# Patient Record
Sex: FEMALE | Employment: FULL TIME | URBAN - METROPOLITAN AREA
[De-identification: names, ages, dates, MRNs, and addresses within clinical notes are randomized per-mention and may not be internally consistent; named-entity substitution may affect disease eponyms.]

---

## 2018-04-12 ENCOUNTER — HOSPITAL ENCOUNTER (EMERGENCY)
Facility: HOSPITAL | Age: 28
Discharge: LEFT BEFORE DISCHARGE | End: 2018-04-12
Attending: EMERGENCY MEDICINE
Payer: MEDICAID

## 2018-04-12 VITALS
HEART RATE: 79 BPM | TEMPERATURE: 98 F | OXYGEN SATURATION: 100 % | WEIGHT: 128 LBS | HEIGHT: 68 IN | RESPIRATION RATE: 18 BRPM | BODY MASS INDEX: 19.4 KG/M2 | SYSTOLIC BLOOD PRESSURE: 129 MMHG | DIASTOLIC BLOOD PRESSURE: 71 MMHG

## 2018-04-12 DIAGNOSIS — Z3A.00 WEEKS OF GESTATION OF PREGNANCY NOT SPECIFIED: ICD-10-CM

## 2018-04-12 DIAGNOSIS — Z34.90 PREGNANCY, UNSPECIFIED GESTATIONAL AGE: Primary | ICD-10-CM

## 2018-04-12 DIAGNOSIS — R10.30 LOWER ABDOMINAL PAIN: ICD-10-CM

## 2018-04-12 LAB
ABO + RH BLD: NORMAL
ALBUMIN SERPL BCP-MCNC: 3.3 G/DL
ALP SERPL-CCNC: 66 U/L
ALT SERPL W/O P-5'-P-CCNC: 7 U/L
ANION GAP SERPL CALC-SCNC: 9 MMOL/L
AST SERPL-CCNC: 19 U/L
B-HCG UR QL: POSITIVE
BASOPHILS # BLD AUTO: 0.02 K/UL
BASOPHILS NFR BLD: 0.3 %
BILIRUB SERPL-MCNC: 0.2 MG/DL
BUN SERPL-MCNC: 8 MG/DL
CALCIUM SERPL-MCNC: 9.4 MG/DL
CHLORIDE SERPL-SCNC: 104 MMOL/L
CO2 SERPL-SCNC: 20 MMOL/L
CREAT SERPL-MCNC: 0.6 MG/DL
CTP QC/QA: YES
DIFFERENTIAL METHOD: ABNORMAL
EOSINOPHIL # BLD AUTO: 0.2 K/UL
EOSINOPHIL NFR BLD: 2 %
ERYTHROCYTE [DISTWIDTH] IN BLOOD BY AUTOMATED COUNT: 16.2 %
EST. GFR  (AFRICAN AMERICAN): >60 ML/MIN/1.73 M^2
EST. GFR  (NON AFRICAN AMERICAN): >60 ML/MIN/1.73 M^2
GLUCOSE SERPL-MCNC: 89 MG/DL
HCG INTACT+B SERPL-ACNC: NORMAL MIU/ML
HCT VFR BLD AUTO: 28 %
HGB BLD-MCNC: 8.5 G/DL
IMM GRANULOCYTES # BLD AUTO: 0.03 K/UL
IMM GRANULOCYTES NFR BLD AUTO: 0.4 %
LYMPHOCYTES # BLD AUTO: 1.8 K/UL
LYMPHOCYTES NFR BLD: 22.7 %
MCH RBC QN AUTO: 24.1 PG
MCHC RBC AUTO-ENTMCNC: 30.4 G/DL
MCV RBC AUTO: 79 FL
MONOCYTES # BLD AUTO: 0.6 K/UL
MONOCYTES NFR BLD: 8 %
NEUTROPHILS # BLD AUTO: 5.2 K/UL
NEUTROPHILS NFR BLD: 66.6 %
NRBC BLD-RTO: 0 /100 WBC
PLATELET # BLD AUTO: 181 K/UL
PMV BLD AUTO: 11.4 FL
POTASSIUM SERPL-SCNC: 3.7 MMOL/L
PROT SERPL-MCNC: 7.9 G/DL
RBC # BLD AUTO: 3.53 M/UL
SODIUM SERPL-SCNC: 133 MMOL/L
WBC # BLD AUTO: 7.85 K/UL

## 2018-04-12 PROCEDURE — 85025 COMPLETE CBC W/AUTO DIFF WBC: CPT

## 2018-04-12 PROCEDURE — 81025 URINE PREGNANCY TEST: CPT | Performed by: EMERGENCY MEDICINE

## 2018-04-12 PROCEDURE — 84702 CHORIONIC GONADOTROPIN TEST: CPT

## 2018-04-12 PROCEDURE — 99283 EMERGENCY DEPT VISIT LOW MDM: CPT | Mod: ,,, | Performed by: EMERGENCY MEDICINE

## 2018-04-12 PROCEDURE — 86901 BLOOD TYPING SEROLOGIC RH(D): CPT

## 2018-04-12 PROCEDURE — 80053 COMPREHEN METABOLIC PANEL: CPT

## 2018-04-12 PROCEDURE — 99284 EMERGENCY DEPT VISIT MOD MDM: CPT | Mod: 25

## 2018-04-12 NOTE — ED NOTES
Patient identifiers verified and correct for Celio Gutierres.    LOC: The patient is awake, alert and aware of environment with an appropriate affect, the patient is oriented x 3 and speaking appropriately.  APPEARANCE: Patient resting comfortably and in no acute distress, patient is clean and well groomed, patient's clothing is properly fastened.  SKIN: The skin is warm and dry, color consistent with ethnicity, patient has normal skin turgor and moist mucus membranes, skin intact, no breakdown or bruising noted.  MUSCULOSKELETAL: Patient moving all extremities spontaneously, no obvious swelling or deformities noted.  RESPIRATORY: Airway is open and patent, respirations are spontaneous, patient has a normal effort and rate, no accessory muscle use noted.  CARDIAC: Patient has a normal rate and regular rhythm, no periphreal edema noted, capillary refill < 3 seconds.  ABDOMEN: Soft and non tender to palpation, no distention noted, normoactive bowel sounds present in all four quadrants.  NEUROLOGIC: PERRL, 3mm bilaterally, eyes open spontaneously, behavior appropriate to situation, follows commands, facial expression symmetrical, bilateral hand grasp equal and even, purposeful motor response noted, normal sensation in all extremities when touched with a finger.

## 2018-04-12 NOTE — ED NOTES
Patient now reports that she did have some spotting yesterday. Every time I wiped there was a little blood on the tissue.

## 2018-04-12 NOTE — ED NOTES
Pt reports lower abdominal 4/10 pain and reports she is pregnant with spotting. Pt reports finding out being pregnant three days ago at her doctor's office. Pt also reports nausea.

## 2018-04-12 NOTE — ED NOTES
Intake informed patient has returned from [place] with her son. Grandmother here and is able to care for her Grandson while her daughter is seen.

## 2018-04-12 NOTE — ED NOTES
LOC: The patient is awake, alert and aware of environment with an appropriate affect, the patient is oriented x 3 and speaking appropriate.  APPEARANCE: Patient resting comfortably and in no acute distress, patient is clean and well groomed, patient's clothing is properly fastened.  SKIN: The skin is warm and dry, patient has normal skin turgor and moist mucus membranes.  RESPIRATORY: Airway is open and patent, respirations are spontaneous, patient has a normal effort and rate.  CARDIAC: Patient has a normal rate and rhythm, no periphreal edema noted, capillary refill < 3 seconds.  ABDOMEN: Pregnant abdomen,last menses 1/2018. Supra pubic area is tender to palpation,  NEUROLOGIC: PERRL, facial expression is symmetrical, patient moving all extremities, normal sensation in all extremities when touched with a finger.  OB/GYN: Denies painful urination or vaginal discharge,just normal milky.

## 2018-04-13 NOTE — ED PROVIDER NOTES
"Encounter Date: 4/12/2018    SCRIBE #1 NOTE: I, Jahaira Claudio, am scribing for, and in the presence of,  Dr. De Santiago. I have scribed the entire note.       History     Chief Complaint   Patient presents with    Abdominal Pain     Supra pubic area x 2 days. Pregnant last menses 1/2018. Denies vaginal d/c.     Time patient was seen by the provider: 7:04 PM      The patient is a 27 y.o. female with no pertinent co-morbidities who presents to the ED with a complaint of suprapubic abdominal pain.  Patient states she just found out she was pregnant a few days ago.  LNMP was January.  She has a seven year old and states her pain feels abnormal to her previous pregnancy.  States that she noticed that she was spotting for almost the whole month of February.  Patient reports that she had an episode of constant, involuntary clear drainage with pink strips that she states was "not urine."  Abdominal pain has been constant for the past two days ago.  She also complains of spotting for the past two days.      The history is provided by the patient and medical records.     Review of patient's allergies indicates:  No Known Allergies  History reviewed. No pertinent past medical history.  History reviewed. No pertinent surgical history.  History reviewed. No pertinent family history.  Social History   Substance Use Topics    Smoking status: Passive Smoke Exposure - Never Smoker    Smokeless tobacco: Never Used    Alcohol use Yes      Comment: Socially     Review of Systems   Constitutional: Negative for chills and fever.   HENT: Negative for ear pain and nosebleeds.    Eyes: Negative for pain and discharge.   Respiratory: Negative for shortness of breath and wheezing.    Cardiovascular: Negative for chest pain.   Gastrointestinal: Positive for abdominal pain (suprapubic). Negative for constipation.   Genitourinary: Negative for dysuria.        + vaginal spotting   Musculoskeletal: Negative for neck pain and neck stiffness. "   Skin: Negative for rash.   Neurological: Negative for speech difficulty and headaches.       Physical Exam     Initial Vitals [18 1701]   BP Pulse Resp Temp SpO2   125/74 91 16 98.2 °F (36.8 °C) 100 %      MAP       91         Physical Exam    Nursing note and vitals reviewed.  Constitutional: She appears well-developed and well-nourished. No distress.   HENT:   Head: Normocephalic and atraumatic.   Eyes: EOM are normal. Pupils are equal, round, and reactive to light.   Neck: Neck supple.   Pulmonary/Chest: Breath sounds normal. No respiratory distress.   Abdominal: There is no tenderness.   Genitourinary:   Genitourinary Comments: Cervix is closed.  Thin, white discharge.   Musculoskeletal: Normal range of motion.   Neurological: She is alert and oriented to person, place, and time. No cranial nerve deficit or sensory deficit.   Skin: Skin is warm and dry.         ED Course   Procedures  Labs Reviewed   CBC W/ AUTO DIFFERENTIAL - Abnormal; Notable for the following:        Result Value    RBC 3.53 (*)     Hemoglobin 8.5 (*)     Hematocrit 28.0 (*)     MCV 79 (*)     MCH 24.1 (*)     MCHC 30.4 (*)     RDW 16.2 (*)     All other components within normal limits   COMPREHENSIVE METABOLIC PANEL - Abnormal; Notable for the following:     Sodium 133 (*)     CO2 20 (*)     Albumin 3.3 (*)     ALT 7 (*)     All other components within normal limits   POCT URINE PREGNANCY - Abnormal; Notable for the following:     POC Preg Test, Ur Positive (*)     All other components within normal limits   HCG, QUANTITATIVE, PREGNANCY   GROUP & RH             Medical Decision Making:   History:   Old Medical Records: I decided to obtain old medical records.  Initial Assessment:   Evaluation of lower abdominal pain in early pregnancy.  Based on dates patient is likely 12 weeks.  Has had no prenatal care.  initial concerns include threatened  or ectopic pregnancy.  Will check labs and get ultrasound.    Clinical Tests:   Lab  Tests: Ordered and Reviewed  Radiological Study: Ordered and Reviewed            Scribe Attestation:   Scribe #1: I performed the above scribed service and the documentation accurately describes the services I performed. I attest to the accuracy of the note.    Attending Attestation:             Attending ED Notes:   9:10pm   Discussed US findings with radiologist who feels patient is well beyond 14 weeks. By policy, patient would need transport to [facility] for imaging. Lab work reviewed, no clinically significant abnormalities. Swabs taken during pelvic exam not sent to lab by RN.    9:24 PM  Was informed earlier that the patient was leaving to go upstairs to be with her son who was in surgery. Patient had been advised of need to transfer to [facility]. I asked nursing staff to have patient sign AMA form as she was aware of need to transfer to [facility]. Patient reports that she did sign this form. I found patient sitting in the hallway. Again, she was advised of need to transfer to [facility] but she stated that she wanted to be with her son. She stated that she made an appointment with her ob-gyn for tomorrow morning. Patient has competence to make this medical decision, understood risks and complications of not completing her medical care. Patient was then taken to surgery waiting room by pediatric ED RN.             Clinical Impression:   There were no encounter diagnoses.    Disposition:   Disposition: AMA  AMA: Patient left AMA after: MD assigned, exam, lab drawn, HPI, lab resulted and x-ray viewed. ED DISP AMA LIST2: transfer to appropriate facility. Patient status: competent, oriented x 3 and not intoxicated. Alternative treatments were explained to the patient. ED DISP AMA LIST7: patient reports signing form however RN cannot provide MD with form to also sign.                         Prabhakar De Santiago MD  04/13/18 0025

## 2018-04-13 NOTE — ED NOTES
DR Matos with radiology spoke with pt to informed pt that ultrasound could not be done since pt is past the first trimester. Pt returned to [facility] and the [facility] area and stated she would like to be discharged if possible bc she would like to be with her son who is being taken up to surgery currently. MD De Santiago called to inform of pts wishes.

## 2018-04-13 NOTE — ED NOTES
"While removing patient's IV, patient encouraged to stay for treatment and possible transfer to [facility]. Patient refusing to stay, stating "MY SON is in surgery."   "

## 2018-04-13 NOTE — ED NOTES
Patient stating her son is in emergent surgery and she needs to leave. Notified Dr. De Santiago over telephone. Dr. De Santiago stating patient should sign AMA form. Patient's IV removed. Patient leaving without speaking with doctor regarding risks/complications.